# Patient Record
Sex: FEMALE | Race: ASIAN | NOT HISPANIC OR LATINO | URBAN - METROPOLITAN AREA
[De-identification: names, ages, dates, MRNs, and addresses within clinical notes are randomized per-mention and may not be internally consistent; named-entity substitution may affect disease eponyms.]

---

## 2021-01-04 ENCOUNTER — OFFICE VISIT (OUTPATIENT)
Dept: FAMILY MEDICINE | Facility: CLINIC | Age: 31
End: 2021-01-04
Payer: COMMERCIAL

## 2021-01-04 VITALS
WEIGHT: 138 LBS | BODY MASS INDEX: 26.06 KG/M2 | HEART RATE: 94 BPM | HEIGHT: 61 IN | DIASTOLIC BLOOD PRESSURE: 82 MMHG | SYSTOLIC BLOOD PRESSURE: 125 MMHG

## 2021-01-04 DIAGNOSIS — Z00.8 ENCOUNTER FOR BIOMETRIC SCREENING: Primary | ICD-10-CM

## 2021-01-04 LAB
CHOLEST SERPL-MCNC: 181 MG/DL
GLUCOSE SERPL-MCNC: 101 MG/DL (ref 70–99)
HDLC SERPL-MCNC: 53 MG/DL
LDLC SERPL CALC-MCNC: 111 MG/DL
NONHDLC SERPL-MCNC: 128 MG/DL
TRIGL SERPL-MCNC: 83 MG/DL

## 2021-01-04 PROCEDURE — 80061 LIPID PANEL: CPT | Performed by: NURSE PRACTITIONER

## 2021-01-04 PROCEDURE — 82947 ASSAY GLUCOSE BLOOD QUANT: CPT | Performed by: NURSE PRACTITIONER

## 2021-01-04 PROCEDURE — 36415 COLL VENOUS BLD VENIPUNCTURE: CPT | Performed by: NURSE PRACTITIONER

## 2021-01-04 ASSESSMENT — MIFFLIN-ST. JEOR: SCORE: 1265.4

## 2021-01-04 NOTE — PATIENT INSTRUCTIONS
"Thank you for completing your biometric screening on 1/4/2021.    Your laboratory results from this visit are:    Triglycerides (TRG) measures the amount of fat in your bloodstream. High levels may raise the risk of heart disease, especially in women.  Normal = under 150 mg/dl  Abnormal = over 150 mg/dl   Your value:   Triglycerides   Date Value Ref Range Status   01/04/2021 83 <150 mg/dL Final        Fasting glucose measures the sugar circulating in your blood stream which is used for energy by all organs, particularly the brain and muscles.    Normal = 70 - 99 mg/dl  Prediabetes = 100 - 125 mg/dl  Diabetes = more than 125 mg/dl   Your value:   Glucose   Date Value Ref Range Status   01/04/2021 101 (H) 70 - 99 mg/dL Final        Total cholesterol measures the total amount of cholesterol in your blood. High cholesterol levels can indicate fatty buildup in your arteries.  Normal = under 200 mg/dl   Borderline = 200 - 239 mg/dl  Abnormal = over 239 mg/dl   Your value:   Cholesterol   Date Value Ref Range Status   01/04/2021 181 <200 mg/dL Final     Comment:     Testing performed on the [facility] at the [facility]        High Density Lipoprotein (HDL) is a measurement of good fat.  A low HDL puts you at higher risk for coronary disease.  Normal Men: Above 39 mg/dl  Normal Women: Above 49 mg/dl  Your value:   HDL Cholesterol   Date Value Ref Range Status   01/04/2021 53 >49 mg/dL Final       Low Density Lipoprotein (LDL) measures the type of cholesterol is referred to by some as \"bad cholesterol.\"  An elevated LDL puts you at a greater risk for coronary disease.  Normal = under 100 mg/dl  Borderline = 100 - 129 mg/dl  Abnormal = above 129 mg/dl   Your value:   LDL Cholesterol Calculated   Date Value Ref Range Status   01/04/2021 111 (H) <100 mg/dL Final     Comment:     Above desirable:  100-129 mg/dl  Borderline High:  130-159 mg/dL  High:             160-189 mg/dL  Very high:       >189 mg/dl   "       Follow-Up    We recommend the following steps based on today's results:  Follow-up with your  during the 1st quarter of 2021 to discuss your abnormal results. You can schedule with your [employer]  by calling [phone] and selecting option 3 or emailing [email or web address]. You must complete this visit by 3/31/2021 to qualify for the wellness program.    As a reminder, you may also be due for the following screening(s):  No additional follow-up needed at this time    If you are due, please call your PCP to schedule.

## 2021-04-26 ENCOUNTER — OFFICE VISIT (OUTPATIENT)
Dept: FAMILY MEDICINE | Facility: CLINIC | Age: 31
End: 2021-04-26

## 2021-04-26 DIAGNOSIS — Z78.9 PARTICIPANT IN HEALTH AND WELLNESS PLAN: Primary | ICD-10-CM

## 2021-04-26 NOTE — PROGRESS NOTES
Goals:  - Reduce sweets & eat healthier foods (green vegetables, low sodium & lean meats)  - Increase exercise (walking after dinner with )    Discussion:  - Would like to maintain 130 lbs  - Was 135 lbs, lost weight by changing eating habits with  (lower carb, brown rice, greens, lean meats) & by increasing activity    EMY Marie, [facility]

## 2022-03-21 ENCOUNTER — OFFICE VISIT (OUTPATIENT)
Dept: FAMILY MEDICINE | Facility: CLINIC | Age: 32
End: 2022-03-21
Payer: COMMERCIAL

## 2022-03-21 VITALS
DIASTOLIC BLOOD PRESSURE: 80 MMHG | HEIGHT: 61 IN | WEIGHT: 136.8 LBS | BODY MASS INDEX: 25.83 KG/M2 | SYSTOLIC BLOOD PRESSURE: 124 MMHG

## 2022-03-21 DIAGNOSIS — Z00.8 ENCOUNTER FOR BIOMETRIC SCREENING: Primary | ICD-10-CM

## 2022-03-21 LAB
CHOLEST SERPL-MCNC: 145 MG/DL
GLUCOSE BLD-MCNC: 81 MG/DL (ref 79–116)
HDLC SERPL-MCNC: 62 MG/DL
LDLC SERPL CALC-MCNC: 70 MG/DL
NONHDLC SERPL-MCNC: 83 MG/DL
TRIGL SERPL-MCNC: 65 MG/DL

## 2022-03-21 PROCEDURE — 36416 COLLJ CAPILLARY BLOOD SPEC: CPT

## 2022-03-21 PROCEDURE — 99207 PR NO CHARGE LOS: CPT

## 2022-03-21 NOTE — PROGRESS NOTES
Biometric screening completed today.     Recommended annual physical with papsmear.    Abigail Hernandez, KATELYN, NP-C

## 2022-03-21 NOTE — PATIENT INSTRUCTIONS
"Thank you for completing your biometric screening on 3/21/2022.    Your laboratory results from this visit are:    Triglycerides (TRG) measures the amount of fat in your bloodstream. High levels may raise the risk of heart disease, especially in women.  Normal = under 150 mg/dl  Abnormal = over 150 mg/dl   Your value:   Triglycerides   Date Value Ref Range Status   03/21/2022 65 <150 mg/dL Final   01/04/2021 83 <150 mg/dL Final        Fasting glucose measures the sugar circulating in your blood stream which is used for energy by all organs, particularly the brain and muscles.    Normal = 70 - 99 mg/dl  Prediabetes = 100 - 125 mg/dl  Diabetes = more than 125 mg/dl   Your value:   Glucose   Date Value Ref Range Status   01/04/2021 101 (H) 70 - 99 mg/dL Final        Total cholesterol measures the total amount of cholesterol in your blood. High cholesterol levels can indicate fatty buildup in your arteries.  Normal = under 200 mg/dl   Borderline = 200 - 239 mg/dl  Abnormal = over 239 mg/dl   Your value:   Cholesterol   Date Value Ref Range Status   03/21/2022 145 <200 mg/dL Final   01/04/2021 181 <200 mg/dL Final     Comment:     Testing performed on the [facility] at the [facility]        High Density Lipoprotein (HDL) is a measurement of good fat.  A low HDL puts you at higher risk for coronary disease.  Normal Men: Above 39 mg/dl  Normal Women: Above 49 mg/dl  Your value:   HDL Cholesterol   Date Value Ref Range Status   01/04/2021 53 >49 mg/dL Final     Direct Measure HDL   Date Value Ref Range Status   03/21/2022 62 >=50 mg/dL Final       Low Density Lipoprotein (LDL) measures the type of cholesterol is referred to by some as \"bad cholesterol.\"  An elevated LDL puts you at a greater risk for coronary disease.  Normal = under 100 mg/dl  Borderline = 100 - 129 mg/dl  Abnormal = above 129 mg/dl   Your value:   LDL Cholesterol Calculated   Date Value Ref Range Status   03/21/2022 70 <=100 mg/dL Final "   01/04/2021 111 (H) <100 mg/dL Final     Comment:     Above desirable:  100-129 mg/dl  Borderline High:  130-159 mg/dL  High:             160-189 mg/dL  Very high:       >189 mg/dl         Follow-Up    We recommend the following steps based on today's results:  No follow-up is necessary.    As a reminder, you may also be due for the following screening(s):  Pap Smear    If you are due, please call your PCP to schedule.